# Patient Record
(demographics unavailable — no encounter records)

---

## 2025-01-27 NOTE — IMAGING
[de-identified] : On examination of his right hand he has swelling of the ring finger.  Healed puncture wound to the volar aspect of the ring finger.  He is tender to palpation mostly over the PIP joint and over the flexor tendon.  No erythema, no drainage.  He is able to flex and extend the finger has some slightly decreased range of motion with full flexion.  No tenderness to palpation to the rest of the hand or fingers.  Sensation is intact throughout, 2+ radial pulse.  Patient was evaluated by Dr. Emerson as well.  X-rays taken in the office today of the ring finger show no fractures, dislocations, or other bony abnormalities.

## 2025-01-27 NOTE — HISTORY OF PRESENT ILLNESS
[de-identified] : 33-year-old male is here today for evaluation of his right ring finger.  Patient states about 3 weeks ago his dog was having a seizure, he states he got nervous and did not want her to bite her tongue so he put his hand to try to open her mouth and she accidentally bit his ring finger.  He states he had a small puncture wound, he went to the urgent care he was never put on any antibiotics.  He states it healed within a week and he was feeling fine but then about 10 days ago he developed pain and swelling in the finger.  He denies any redness or drainage from the area.  He denies any fevers or chills.

## 2025-01-27 NOTE — DISCUSSION/SUMMARY
[de-identified] : At this time we are going to get an MRI of his finger to rule out any infection and evaluate the flexor tendon.  He is going to work on his range of motion, do some warm compresses, anti-inflammatories as needed for pain.  He will call after the MRI to discuss the results with Dr. Emerson. Patient will call me if any other problems or concerns.  Patient verbalized understanding and agreed with the plan, all questions were answered in the office today.

## 2025-02-24 NOTE — DATA REVIEWED
[FreeTextEntry1] :  Pathology was discussed with him showing chronic and acute inflammation. Cultures were negative

## 2025-02-24 NOTE — ASSESSMENT
[FreeTextEntry1] : Patient underwent tenosynovectomy for his left ring finger.  He is doing much better.  He will continue work on range of motion exercise instruction exercises.  He will see me back on a 1 month basis for a range of motion check.  He continues with his therapy program

## 2025-02-24 NOTE — PHYSICAL EXAM
[de-identified] : Patient is well-healed surgical skin incision.  Good range of motion of the finger.  No erythema ecchymoses or abrasions.  No masses.  He has well-healed surgical skin incision.  Improved range of motion

## 2025-02-24 NOTE — HISTORY OF PRESENT ILLNESS
[de-identified] : 33-year-old male status post right hand ring finger flexor tenosynovectomy.  Comes in the office for evaluation he is doing better.  He has better range of motion.  Less pain and discomfort.

## 2025-03-19 NOTE — PHYSICAL EXAM
[de-identified] : Patient is well-healed surgical skin incision.  Good range of motion of the finger.  No erythema ecchymoses or abrasions.  No masses.  He has well-healed surgical skin incision.  He can make a full fist.

## 2025-03-19 NOTE — HISTORY OF PRESENT ILLNESS
[de-identified] : 33-year-old male status post right hand ring finger flexor tenosynovectomy.  He is doing much better.  He completed his last session of therapy today.  He does not have any complaints.

## 2025-03-19 NOTE — DISCUSSION/SUMMARY
[de-identified] : Patient is status post a right ring finger flexor tenosynovectomy.  He has no complaints today.  He may resume normal activities as tolerated.  He will follow-up as needed.  All questions were answered today.